# Patient Record
Sex: MALE | Race: WHITE | URBAN - METROPOLITAN AREA
[De-identification: names, ages, dates, MRNs, and addresses within clinical notes are randomized per-mention and may not be internally consistent; named-entity substitution may affect disease eponyms.]

---

## 2024-07-30 ENCOUNTER — OFFICE VISIT (OUTPATIENT)
Dept: SURGERY | Facility: CLINIC | Age: 64
End: 2024-07-30
Payer: MEDICARE

## 2024-07-30 DIAGNOSIS — M79.89 SOFT TISSUE MASS: Primary | ICD-10-CM

## 2024-07-30 PROCEDURE — 1036F TOBACCO NON-USER: CPT | Performed by: PHYSICIAN ASSISTANT

## 2024-07-30 PROCEDURE — 1159F MED LIST DOCD IN RCRD: CPT | Performed by: PHYSICIAN ASSISTANT

## 2024-07-30 PROCEDURE — 99203 OFFICE O/P NEW LOW 30 MIN: CPT | Performed by: PHYSICIAN ASSISTANT

## 2024-07-30 NOTE — PROGRESS NOTES
Subjective   Patient ID: Aftab Blair is a 64 y.o. male who presents for New Patient Visit and Mass (LEFT ARMPIT MASS).    HPI  This is a 64-year-old gentleman whose had a soft tissue mass under his left arm in his pit area for the past 2 years.  He has seen a dermatologist in his primary care they told him was a lipoma he did not want to get it out but it is grown larger in the past year and is now causing him some difficulties putting his arm close to his side.  He would like to have it removed.    Review of Systems  Review of systems is negative other than what is mentioned above      Physical Exam  Eyes: Conjunctiva non -icteric and eye lids are without obvious rash or drooping. Pupils are symmetric.   Ears, Nose, Mouth, and Throat: External ears and nose appear to be without deformity or rash. No lesions or masses noted. Hearing is grossly intact.   Neck:. No JVD noted, tracheal position is midline. No thyromegaly, no thyroid nodules  Head and Face: Examination of the head and face revealed no abnormalities.   Respiratory: No gasping or shortness of breath noted, no use of accessory muscles noted.  Lungs are clear to auscultate  Cardiovascular: Examination for edema is normal, regular rate and rhythm S1-S2  GI: Abdomen non tender to palpation, bowel sounds are present  Skin: Patient has a left axillary soft movable tissue mass well-rounded.  Right axilla is clear no inguinal adenopathy.  Musk: Digits/nails show no clubbing or cyanosis. No asymmetry or masses noted of the musculature. Examination of the muscles/joints/bones show normal range of motion. Gait is grossly normally.   Neurologic: Cranial nerves II- XII intact, motor strength 5/5 muscle strength of the lower extremities bilaterally and equal.  Is        Objective     No diagnosis found.   There is no problem list on file for this patient.     No Known Allergies   Medication Documentation Review Audit       Reviewed by Tyra Mclaughlin MA (Medical  Assistant) on 07/30/24 at 1321      Medication Order Taking? Sig Documenting Provider Last Dose Status            No Medications to Display                                   History reviewed. No pertinent past medical history.  Social History     Tobacco Use   Smoking Status Never   Smokeless Tobacco Never     No family history on file.   History reviewed. No pertinent surgical history.    Assessment/Plan   Today we had a discussion about excision of soft tissue mass.  Patient was instructed that this would be done as an outpatient surgery would take about 1 hour and require general anesthesia.  Patient was instructed mass once excised will be sent to pathology for review.  Risk and benefits such as bleeding and infection were discussed.  Alternatives treatments were discussed.  All questions were answered.  Patient would like to proceed.    Encounter Diagnosis   Name Primary?    Soft tissue mass Yes     I have reviewed all data including labs,radiologic and previous reports.      **Portions of this medical record have been created using voice recognition software and may have minor errors which are inherent in voice recognition systems. It has not been fully edited for typographical or grammatical errors**

## 2024-08-12 ENCOUNTER — APPOINTMENT (OUTPATIENT)
Dept: SURGERY | Facility: CLINIC | Age: 64
End: 2024-08-12

## 2024-08-27 ENCOUNTER — APPOINTMENT (OUTPATIENT)
Dept: SURGERY | Facility: CLINIC | Age: 64
End: 2024-08-27
Payer: MEDICARE

## 2024-08-27 DIAGNOSIS — D17.22 LIPOMA OF LEFT UPPER EXTREMITY: Primary | ICD-10-CM

## 2024-08-27 PROCEDURE — 99024 POSTOP FOLLOW-UP VISIT: CPT | Performed by: PHYSICIAN ASSISTANT

## 2024-08-27 NOTE — PROGRESS NOTES
Subjective   Patient ID: Aftab Blair is a 64 y.o. male who presents for status post left axillary soft tissue mass excision      HPI  Patient is without 3 weeks status post a excision of the left axillary mass.  Patient is doing well he has no complaints      Review of Systems  Review of systems is negative other than what is mentioned above      Physical Exam  Left axillary incision is clean dry and intact no erythema no swelling no drainage    Objective     No diagnosis found.   There is no problem list on file for this patient.     No Known Allergies   Medication Documentation Review Audit       Reviewed by Tyra Mclaughlin MA (Medical Assistant) on 07/30/24 at 1321      Medication Order Taking? Sig Documenting Provider Last Dose Status            No Medications to Display                                   No past medical history on file.  Social History     Tobacco Use   Smoking Status Never   Smokeless Tobacco Never     No family history on file.   Past Surgical History:   Procedure Laterality Date    COLONOSCOPY W/ BIOPSIES         Assessment/Plan   Today we discussed his pathology results showed lipoma.  Patient was instructed to resume normal activity and follow-up as needed      Encounter Diagnosis   Name Primary?    Lipoma of left upper extremity Yes     I have reviewed all data including labs,radiologic and previous reports.   **Portions of this medical record have been created using voice recognition software and may have minor errors which we are inherent in voice recognition systems it has not been fully edited for typographical or grammatical errors**      Mitra Acosta PA-C